# Patient Record
Sex: FEMALE | Race: WHITE | URBAN - METROPOLITAN AREA
[De-identification: names, ages, dates, MRNs, and addresses within clinical notes are randomized per-mention and may not be internally consistent; named-entity substitution may affect disease eponyms.]

---

## 2024-07-22 ENCOUNTER — APPOINTMENT (RX ONLY)
Dept: URBAN - METROPOLITAN AREA CLINIC 166 | Facility: CLINIC | Age: 39
Setting detail: DERMATOLOGY
End: 2024-07-22

## 2024-07-22 DIAGNOSIS — L83 ACANTHOSIS NIGRICANS: ICD-10-CM

## 2024-07-22 DIAGNOSIS — L73.2 HIDRADENITIS SUPPURATIVA: ICD-10-CM

## 2024-07-22 DIAGNOSIS — L91.8 OTHER HYPERTROPHIC DISORDERS OF THE SKIN: ICD-10-CM

## 2024-07-22 DIAGNOSIS — L71.8 OTHER ROSACEA: ICD-10-CM

## 2024-07-22 PROCEDURE — ? TREATMENT REGIMEN

## 2024-07-22 PROCEDURE — ? PRESCRIPTION

## 2024-07-22 PROCEDURE — ? COUNSELING

## 2024-07-22 PROCEDURE — 99204 OFFICE O/P NEW MOD 45 MIN: CPT

## 2024-07-22 RX ORDER — CLINDAMYCIN PHOSPHATE 10 MG/ML
SOLUTION TOPICAL
Qty: 60 | Refills: 3 | Status: ERX | COMMUNITY
Start: 2024-07-22

## 2024-07-22 RX ORDER — METFORMIN HYDROCHLORIDE 500 MG/1
TABLET, EXTENDED RELEASE ORAL
Qty: 120 | Refills: 3 | Status: ERX | COMMUNITY
Start: 2024-07-22

## 2024-07-22 RX ADMIN — METFORMIN HYDROCHLORIDE: 500 TABLET, EXTENDED RELEASE ORAL at 00:00

## 2024-07-22 RX ADMIN — CLINDAMYCIN PHOSPHATE: 10 SOLUTION TOPICAL at 00:00

## 2024-07-22 ASSESSMENT — LOCATION SIMPLE DESCRIPTION DERM
LOCATION SIMPLE: RIGHT BREAST
LOCATION SIMPLE: GROIN
LOCATION SIMPLE: LEFT BREAST
LOCATION SIMPLE: RIGHT AXILLARY VAULT
LOCATION SIMPLE: LEFT AXILLARY VAULT
LOCATION SIMPLE: LEFT CHEEK

## 2024-07-22 ASSESSMENT — LOCATION ZONE DERM
LOCATION ZONE: AXILLAE
LOCATION ZONE: TRUNK
LOCATION ZONE: FACE

## 2024-07-22 ASSESSMENT — LOCATION DETAILED DESCRIPTION DERM
LOCATION DETAILED: RIGHT INFRAMAMMARY CREASE (INNER QUADRANT)
LOCATION DETAILED: LEFT INGUINAL CREASE
LOCATION DETAILED: LEFT AXILLARY VAULT
LOCATION DETAILED: RIGHT INGUINAL CREASE
LOCATION DETAILED: LEFT INFRAMAMMARY CREASE (INNER QUADRANT)
LOCATION DETAILED: RIGHT AXILLARY VAULT
LOCATION DETAILED: LEFT CENTRAL MALAR CHEEK

## 2024-07-22 NOTE — PROCEDURE: TREATMENT REGIMEN
Detail Level: Zone
Plan: Patient states heat is typically her trigger. Patient does not consume caffeine and does not drink alcohol.
Otc Regimen: Encouraged daily sunscreen use.\\nIvermectin 1.87% horse paste 1-2 times daily (OTC)
Initiate Treatment: clindamycin phosphate 1 % topical solution: Apply to affected areas one to two times daily as needed after washing with benzoyl peroxide.\\n\\nPatient will start by taking 1 daily and increase to 4 daily if no GI issues\\nmetformin  mg tablet,extended release 24 hr: Take four 500 tablets by mouth with food and water daily. Okay to take all at once.
Plan: Clinically, examined areas do not appear to be HS. There is pigment and textural change, which could be due to the skin tags and Acanthosis Nigricans from insulin resistance due to diabetes. Patient does not consume caffeine, does not drink alcohol, and does not smoke. Patient just started ozempic in the last 1-2 weeks. Discussed starting metformin to help with inflammation.
Otc Regimen: Wash affected areas with BP wash in the shower daily.